# Patient Record
Sex: FEMALE | Race: WHITE | NOT HISPANIC OR LATINO | Employment: FULL TIME | URBAN - METROPOLITAN AREA
[De-identification: names, ages, dates, MRNs, and addresses within clinical notes are randomized per-mention and may not be internally consistent; named-entity substitution may affect disease eponyms.]

---

## 2023-11-06 ENCOUNTER — HOSPITAL ENCOUNTER (OUTPATIENT)
Dept: RADIOLOGY | Facility: MEDICAL CENTER | Age: 18
End: 2023-11-06
Payer: OTHER MISCELLANEOUS

## 2023-11-06 DIAGNOSIS — R22.1 NECK MASS: ICD-10-CM

## 2023-11-06 PROCEDURE — 76536 US EXAM OF HEAD AND NECK: CPT

## 2023-11-21 ENCOUNTER — HOSPITAL ENCOUNTER (OUTPATIENT)
Dept: RADIOLOGY | Facility: MEDICAL CENTER | Age: 18
End: 2023-11-21
Payer: OTHER MISCELLANEOUS

## 2023-11-21 DIAGNOSIS — D37.032 NEOPLASM OF UNCERTAIN BEHAVIOR OF SUBMANDIBULAR GLAND: ICD-10-CM

## 2023-11-21 PROCEDURE — 88305 TISSUE EXAM BY PATHOLOGIST: CPT

## 2023-11-21 PROCEDURE — 10005 FNA BX W/US GDN 1ST LES: CPT

## 2023-11-21 PROCEDURE — 88173 CYTOPATH EVAL FNA REPORT: CPT

## 2023-11-21 NOTE — PROGRESS NOTES
US guided right submandibular lymph node nodule fine needle aspiration done by Dr. Aparicio; NON-SEDATION (no H&P required as this is a NON SEDATION procedure) right anterior aspect of neck access site, dressing CDI; 4 passes in 1 jar of cytolyt obtained, picked up and delivered to pathology lab. Pt tolerated the procedure well. Pt hemodynamically stable pre/intra/post procedure; all questions and concerns answered prior to being d/c; patient provided with appropriate education for procedure; pt d/c home.

## 2023-11-22 LAB — CYTOLOGY REG CYTOL: NORMAL

## 2023-12-01 ENCOUNTER — APPOINTMENT (OUTPATIENT)
Dept: RADIOLOGY | Facility: MEDICAL CENTER | Age: 18
End: 2023-12-01
Payer: COMMERCIAL

## 2024-05-13 ENCOUNTER — APPOINTMENT (OUTPATIENT)
Dept: ADMISSIONS | Facility: MEDICAL CENTER | Age: 19
End: 2024-05-13
Attending: OTOLARYNGOLOGY
Payer: COMMERCIAL

## 2024-05-24 ENCOUNTER — PRE-ADMISSION TESTING (OUTPATIENT)
Dept: ADMISSIONS | Facility: MEDICAL CENTER | Age: 19
End: 2024-05-24
Attending: OTOLARYNGOLOGY
Payer: COMMERCIAL

## 2024-05-25 NOTE — OR NURSING
Pre admit appointment completed with Amara Evans.  Medication and fasting instructions given per RN pre procedure protocol. Pt instructed to stop taking all vitamins and herbal supplements 7 days prior to surgery. Pt instructed to stop any NSAIDS 5 days prior to surgery, unless otherwise instructed by medical provider. Pt verbalizes understanding of all instructions given. No further questions at this time. Amara denies taking any medications at this time.

## 2024-06-07 ENCOUNTER — APPOINTMENT (OUTPATIENT)
Dept: ADMISSIONS | Facility: MEDICAL CENTER | Age: 19
End: 2024-06-07
Attending: OTOLARYNGOLOGY
Payer: COMMERCIAL

## 2024-06-10 ENCOUNTER — HOSPITAL ENCOUNTER (OUTPATIENT)
Facility: MEDICAL CENTER | Age: 19
End: 2024-06-10
Attending: OTOLARYNGOLOGY | Admitting: OTOLARYNGOLOGY
Payer: COMMERCIAL

## 2024-06-10 ENCOUNTER — ANESTHESIA (OUTPATIENT)
Dept: SURGERY | Facility: MEDICAL CENTER | Age: 19
End: 2024-06-10
Payer: COMMERCIAL

## 2024-06-10 ENCOUNTER — ANESTHESIA EVENT (OUTPATIENT)
Dept: SURGERY | Facility: MEDICAL CENTER | Age: 19
End: 2024-06-10
Payer: COMMERCIAL

## 2024-06-10 ENCOUNTER — PHARMACY VISIT (OUTPATIENT)
Dept: PHARMACY | Facility: MEDICAL CENTER | Age: 19
End: 2024-06-10
Payer: COMMERCIAL

## 2024-06-10 PROBLEM — D37.032 NEOPLASM OF UNCERTAIN BEHAVIOR OF SUBMANDIBULAR GLAND: Chronic | Status: ACTIVE | Noted: 2024-06-10

## 2024-06-10 PROCEDURE — RXMED WILLOW AMBULATORY MEDICATION CHARGE: Performed by: OTOLARYNGOLOGY

## 2024-06-10 PROCEDURE — 700101 HCHG RX REV CODE 250: Performed by: ANESTHESIOLOGY

## 2024-06-10 PROCEDURE — 700111 HCHG RX REV CODE 636 W/ 250 OVERRIDE (IP): Performed by: ANESTHESIOLOGY

## 2024-06-10 PROCEDURE — 700105 HCHG RX REV CODE 258: Performed by: OTOLARYNGOLOGY

## 2024-06-10 RX ORDER — LIDOCAINE HYDROCHLORIDE 20 MG/ML
INJECTION, SOLUTION EPIDURAL; INFILTRATION; INTRACAUDAL; PERINEURAL PRN
Status: DISCONTINUED | OUTPATIENT
Start: 2024-06-10 | End: 2024-06-10 | Stop reason: SURG

## 2024-06-10 RX ORDER — EPHEDRINE SULFATE 50 MG/ML
INJECTION, SOLUTION INTRAVENOUS PRN
Status: DISCONTINUED | OUTPATIENT
Start: 2024-06-10 | End: 2024-06-10 | Stop reason: SURG

## 2024-06-10 RX ORDER — MIDAZOLAM HYDROCHLORIDE 1 MG/ML
INJECTION INTRAMUSCULAR; INTRAVENOUS PRN
Status: DISCONTINUED | OUTPATIENT
Start: 2024-06-10 | End: 2024-06-10 | Stop reason: SURG

## 2024-06-10 RX ORDER — CEFAZOLIN SODIUM 1 G/3ML
INJECTION, POWDER, FOR SOLUTION INTRAMUSCULAR; INTRAVENOUS PRN
Status: DISCONTINUED | OUTPATIENT
Start: 2024-06-10 | End: 2024-06-10 | Stop reason: SURG

## 2024-06-10 RX ORDER — ONDANSETRON 2 MG/ML
INJECTION INTRAMUSCULAR; INTRAVENOUS PRN
Status: DISCONTINUED | OUTPATIENT
Start: 2024-06-10 | End: 2024-06-10 | Stop reason: SURG

## 2024-06-10 RX ORDER — DEXAMETHASONE SODIUM PHOSPHATE 4 MG/ML
INJECTION, SOLUTION INTRA-ARTICULAR; INTRALESIONAL; INTRAMUSCULAR; INTRAVENOUS; SOFT TISSUE PRN
Status: DISCONTINUED | OUTPATIENT
Start: 2024-06-10 | End: 2024-06-10 | Stop reason: SURG

## 2024-06-10 RX ORDER — KETOROLAC TROMETHAMINE 15 MG/ML
INJECTION, SOLUTION INTRAMUSCULAR; INTRAVENOUS PRN
Status: DISCONTINUED | OUTPATIENT
Start: 2024-06-10 | End: 2024-06-10 | Stop reason: SURG

## 2024-06-10 RX ADMIN — SUGAMMADEX 200 MG: 100 INJECTION, SOLUTION INTRAVENOUS at 09:28

## 2024-06-10 RX ADMIN — KETOROLAC TROMETHAMINE 30 MG: 15 INJECTION, SOLUTION INTRAMUSCULAR; INTRAVENOUS at 07:55

## 2024-06-10 RX ADMIN — PROPOFOL 150 MG: 10 INJECTION, EMULSION INTRAVENOUS at 07:37

## 2024-06-10 RX ADMIN — EPHEDRINE SULFATE 10 MG: 50 INJECTION, SOLUTION INTRAVENOUS at 08:27

## 2024-06-10 RX ADMIN — LIDOCAINE HYDROCHLORIDE 30 MG: 20 INJECTION, SOLUTION EPIDURAL; INFILTRATION; INTRACAUDAL at 07:37

## 2024-06-10 RX ADMIN — ONDANSETRON 4 MG: 2 INJECTION INTRAMUSCULAR; INTRAVENOUS at 07:55

## 2024-06-10 RX ADMIN — MIDAZOLAM HYDROCHLORIDE 2 MG: 2 INJECTION, SOLUTION INTRAMUSCULAR; INTRAVENOUS at 07:32

## 2024-06-10 RX ADMIN — FENTANYL CITRATE 50 MCG: 50 INJECTION, SOLUTION INTRAMUSCULAR; INTRAVENOUS at 07:37

## 2024-06-10 RX ADMIN — DEXAMETHASONE SODIUM PHOSPHATE 8 MG: 4 INJECTION INTRA-ARTICULAR; INTRALESIONAL; INTRAMUSCULAR; INTRAVENOUS; SOFT TISSUE at 07:55

## 2024-06-10 RX ADMIN — SODIUM CHLORIDE, POTASSIUM CHLORIDE, SODIUM LACTATE AND CALCIUM CHLORIDE: 600; 310; 30; 20 INJECTION, SOLUTION INTRAVENOUS at 07:32

## 2024-06-10 RX ADMIN — CEFAZOLIN 2 G: 1 INJECTION, POWDER, FOR SOLUTION INTRAMUSCULAR; INTRAVENOUS at 07:32

## 2024-06-10 RX ADMIN — FENTANYL CITRATE 50 MCG: 50 INJECTION, SOLUTION INTRAMUSCULAR; INTRAVENOUS at 08:46

## 2024-06-10 RX ADMIN — ROCURONIUM BROMIDE 35 MG: 10 INJECTION, SOLUTION INTRAVENOUS at 07:37

## 2024-06-10 RX ADMIN — FENTANYL CITRATE 50 MCG: 50 INJECTION, SOLUTION INTRAMUSCULAR; INTRAVENOUS at 08:05

## 2024-06-10 ASSESSMENT — PAIN DESCRIPTION - PAIN TYPE
TYPE: SURGICAL PAIN

## 2024-06-10 ASSESSMENT — PAIN SCALES - GENERAL: PAIN_LEVEL: 6

## 2024-06-10 NOTE — ANESTHESIA PROCEDURE NOTES
Airway    Date/Time: 6/10/2024 7:38 AM    Performed by: Jordin Augustin M.D.  Authorized by: Jordin Augustin M.D.    Location:  OR  Urgency:  Elective  Indications for Airway Management:  Anesthesia      Spontaneous Ventilation: absent    Sedation Level:  Deep  Preoxygenated: Yes    Patient Position:  Sniffing  Final Airway Type:  Endotracheal airway  Final Endotracheal Airway:  ETT  Cuffed: Yes    Technique Used for Successful ETT Placement:  Direct laryngoscopy    Insertion Site:  Oral  Blade Type:  Rk  Laryngoscope Blade/Videolaryngoscope Blade Size:  3  ETT Size (mm):  7.0  Measured from:  Teeth  ETT to Teeth (cm):  21  Placement Verified by: auscultation and capnometry    Cormack-Lehane Classification:  Grade I - full view of glottis  Number of Attempts at Approach:  1

## 2024-06-10 NOTE — OP REPORT
PreOp Diagnosis: Submandibular gland mass       PostOp Diagnosis: Same      Procedure(s):  RIGHT EXCISION, SUBMANDIBULAR (SUBMAXILLARY) GLAND - Wound Class: Clean with Drain    Surgeon(s):  JAKI Taylor M.D.    Anesthesiologist/Type of Anesthesia:  Anesthesiologist: Jordin Augustin M.D./General    Surgical Staff:  Circulator: Neida Wynne R.N.; Bre Mann R.N.  Relief Circulator: Debra Patterson R.N.  Scrub Person: Gaviota Holloway    Specimens removed if any:  ID Type Source Tests Collected by Time Destination   A : right submandibular gland stitch marks anterior Other Other PATHOLOGY SPECIMEN Jackelin Schmitt M.D. 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 Informed consent was obtained. Any questions were answered. The RIGHT neck was marked.  The patient was brought back to the operating room and placed in a supine position on the OR table. General endotracheal anesthesia was induced and they were endotracheally intubated. An incision was marked 2 finger breadths below the mandible and injected with 1% lidocaine with 1:100,000 epinephrine. The facial nerve monitor was set up and registered on the buccal and marginal mandibular nerve branches.  The patient was prepped and draped in the standard fashion. A timeout procedure was performed.    Incision was made through the skin with a 15 blade scalpel.  Care was taken to divide the platysma without injuring the marginal mandibular nerve.  The bottom of the gland was identified and skeletonized.  A structure consistent with the nerve was confirmed with NIMS monitor.  The nerve was dissected off of the lateral surface of the gland and retracted superiorly.  The gland was dissected from all of its soft tissue attachments  inferiorly, anteriorly, posteriorly and on the deep surface.  The branch from the facial artery was clamped and tied.  The superior aspect of the gland was  from the mandible and the sublingual gland.  The mylohyoid was retracted anteriorly.  The duct and ganglion were identified superiorly in the gland and ligated over sutures.  The gland was freed completely and sent as specimen.    Wound was copiously irrigated.  It was hemostatic with Valsalva.  A fully perforated 10 round BERNADETTE drain was brought out through a separate stab incision posterior to the surgical incision.  The incision was closed in layers with a deep layer of 3-0 Vicryl and the skin was closed with 5-0 monocryl.  The wound was dressed steri strips and mastisol.  Patient was returned to the care of anesthesia where she was awakened extubated and taken to the PACU in stable condition.     All counts were correct x2.

## 2024-06-10 NOTE — DISCHARGE INSTRUCTIONS
If any questions arise, call your provider.  If your provider is not available, please feel free to call the [facility] at [phone].    MEDICATIONS: Resume taking daily medication.  Take prescribed pain medication with food.  If no medication is prescribed, you may take non-aspirin pain medication if needed.  PAIN MEDICATION CAN BE VERY CONSTIPATING.  Take a stool softener or laxative such as senokot, pericolace, or milk of magnesia if needed.    Last pain medication given: Toradol (like Ibuprofen/Motrin) given at 7:55 AM, next dose can be taken after 1:55 PM.    What to Expect Post Anesthesia    Rest and take it easy for the first 24 hours.  A responsible adult is recommended to remain with you during that time.  It is normal to feel sleepy.  We encourage you to not do anything that requires balance, judgment or coordination.    FOR 24 HOURS DO NOT:  Drive, operate machinery or run household appliances.  Drink beer or alcoholic beverages.  Make important decisions or sign legal documents.    To avoid nausea, slowly advance diet as tolerated, avoiding spicy or greasy foods for the first day.  Add more substantial food to your diet according to your provider's instructions.  Babies can be fed formula or breast milk as soon as they are hungry.  INCREASE FLUIDS AND FIBER TO AVOID CONSTIPATION.    MILD FLU-LIKE SYMPTOMS ARE NORMAL.  YOU MAY EXPERIENCE GENERALIZED MUSCLE ACHES, THROAT IRRITATION, HEADACHE AND/OR SOME NAUSEA.

## 2024-06-10 NOTE — DISCHARGE INSTR - OTHER INFO
Submandibular Gland Excision Postop Instructions    Submandibular gland excision involves removing the submandibular gland which is a salivary gland that sits under the jaw.  Usually this is done for masses or stones within the gland.      Medications  You should take Tylenol and ibuprofen after surgery alternating to help control your pain.  You will be prescribed narcotic pain medications, and some patients require these postoperatively.   These have side effects and should only be used as needed.  If your pain medicine is ineffective, please contact our clinic.  Your normal home medications can generally be restarted the day after surgery, but be sure to discuss your specific situation with your doctor.    Diet  Patients do not have any diet restrictions after surgery.  Some patients may experience postoperative nausea from the anesthesia, so a bland diet is preferred for at least the first meal.  Soft foods are generally easier to chew and swallow initially after surgery.      Nerve Weakness  Several important nerves are at risk of damage during this operation.  While the risk of permanent weakness of these nerves is low, it is still possible.  This can cause difficulty moving the corner of the lip, difficulty moving the tongue, abnormal taste or sensation in the tongue.  Let your doctor know if you experience any of these symptoms.    Other Concerns  Avoid heavy lifting or strenuous activities for 2 weeks after surgery.  We recommend 2 weeks off of work, but this may vary based on your job.  You may experience tightness of the throat or sticking when swallowing.  This will subside as the incision heals.  Avoid vigorous throat clearing or coughing.  It will often feel like you need to clear your throat or that there is something stuck in the back of your throat, but this is part of the healing response.  Low grade fevers are expected and can be treated with Tylenol.  High fevers are not normal (>101.5).  If this  occurs seek medical attention.  Productive cough or difficulty breathing are not normal, seek medical attention.      Wound Concerns  Your wound was closed with multiple layers of stitches.  You will be able to see a layer of paper bandages on the outside.  These will be removed at your post operative appointment(s).  You can get the area wet 24 hours after your drain is removed.  Avoid soaking the incision in a bath and avoid letting the water from the shower hit it directly.   Gently pat the area to dry.  If the wound develops increasing pain, increasing redness, increasing swelling, or drainage that is milky or foul smelling, please contact us as soon as possible as these are signs of an infection.  You also have a drain in the area behind the incision.  The drain is attached to the skin with a small stitch.  You need to strip the drain and measure the output twice a day.      Follow-up  Please call [phone] for any questions or concerns.    You should have a follow-up appointment with Dr. Schmitt 1 week after surgery.  Be sure to ask about your pathology report at the time of follow-up.

## 2024-06-10 NOTE — ANESTHESIA TIME REPORT
Anesthesia Start and Stop Event Times       Date Time Event    6/10/2024 0721 Ready for Procedure     0732 Anesthesia Start     1005 Anesthesia Stop          Responsible Staff  06/10/24      Name Role Begin End    Jordin Augustin M.D. Anesth 0732 1005          Overtime Reason:  no overtime (within assigned shift)    Comments:

## 2024-06-10 NOTE — ANESTHESIA POSTPROCEDURE EVALUATION
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 Responsible Provider: Jordin Augustin M.D.    Anesthesia Type: general ASA Status: 1            Final Anesthesia Type: general  Last vitals  BP   Blood Pressure: 104/62    Temp   36.4 °C (97.6 °F)    Pulse   76   Resp   16    SpO2   95 %      Anesthesia Post Evaluation    Patient location during evaluation: PACU  Patient participation: complete - patient participated  Level of consciousness: awake and alert  Pain score: 6    Airway patency: patent  Anesthetic complications: no  Cardiovascular status: hemodynamically stable  Respiratory status: acceptable  Hydration status: euvolemic    PONV: none          No notable events documented.     Nurse Pain Score: 6 (NPRS)

## 2024-06-10 NOTE — OR SURGEON
Immediate Post OP Note    PreOp Diagnosis: Submandibular gland mass       PostOp Diagnosis: Same      Procedure(s):  RIGHT EXCISION, SUBMANDIBULAR (SUBMAXILLARY) GLAND - Wound Class: Clean with Drain    Surgeon(s):  JAKI Taylor M.D.    Anesthesiologist/Type of Anesthesia:  Anesthesiologist: Jordin Augustin M.D./General    Surgical Staff:  Circulator: Neida Wynne RKOJO; Bre Mann R.N.  Relief Circulator: Debra Patterson R.N.  Scrub Person: Gaviota Holloway    Specimens removed if any:  ID Type Source Tests Collected by Time Destination   A : right submandibular gland stitch marks anterior Other Other PATHOLOGY SPECIMEN Jackelin Schmitt M.D. 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

## 2024-06-10 NOTE — ANESTHESIA PREPROCEDURE EVALUATION
Case: [id number] Date/Time: 06/10/24 0715    Procedure: RIGHT EXCISION, SUBMANDIBULAR (SUBMAXILLARY) GLAND    Pre-op diagnosis: D37.032    Location: [facility] ROOM 22 / SURGERY SAME DAY [facility]    Surgeons: Jackelin Schmitt M.D.            Relevant Problems   No relevant active problems       Physical Exam    Airway   Mallampati: II  TM distance: >3 FB  Neck ROM: full       Cardiovascular - normal exam  Rhythm: regular  Rate: normal  (-) murmur     Dental - normal exam           Pulmonary - normal exam  Breath sounds clear to auscultation     Abdominal    Neurological - normal exam                   Anesthesia Plan    ASA 1       Plan - general       Airway plan will be ETT          Induction: intravenous    Postoperative Plan: Postoperative administration of opioids is intended.    Pertinent diagnostic labs and testing reviewed    Informed Consent:    Anesthetic plan and risks discussed with patient.    Use of blood products discussed with: patient whom consented to blood products.

## 2024-06-10 NOTE — OR NURSING
0988 received patient from OR. Report from Anesthesiologist and OR RN. Patient on 4L at 100 % O2. VSS. Monitor connected. Bite block in place. S/P Right submandibular gland excision, incision visualized, BERNADETTE drain to right side. Draining small about of bright blood. Site CDI.     0947 Patient complains of no pain or nausea at this time.     1000 MD at bedside talking to patient.  Patient tolerating oral fluids well.     1005 Mother at bedside, updated on patients status and plan of care.     1015 Discharge instructions covered as well as drain care with Mom, verbalizes understanding. All questions answered at this time.    1021 Pain medication given see MAR.    1100 Patient eating pudding.     1111 Nausea medication given see MAR.    1125 Medication picked up by CCT from [facility] pharmacy. Patient requesting Tylenol, MD notified, order obtained.    1134 Pain medication given see MAR.    1200 Patient resting in bed comfortably, VSS.    1233 Patient getting dressed.     1235 Patient up to restroom.    1241 Nausea medication given see MAR.    1245 Mother updated on patients status and plan of care.     1305 Meets d/c criteria, IV removed, escorted out with all belongings